# Patient Record
Sex: FEMALE | Race: WHITE | Employment: UNEMPLOYED | URBAN - METROPOLITAN AREA
[De-identification: names, ages, dates, MRNs, and addresses within clinical notes are randomized per-mention and may not be internally consistent; named-entity substitution may affect disease eponyms.]

---

## 2022-12-31 ENCOUNTER — HOSPITAL ENCOUNTER (INPATIENT)
Age: 32
LOS: 3 days | Discharge: HOME | End: 2023-01-03
Attending: OBSTETRICS & GYNECOLOGY | Admitting: OBSTETRICS & GYNECOLOGY
Payer: COMMERCIAL

## 2022-12-31 PROBLEM — O26.893 ABDOMINAL PAIN DURING PREGNANCY, THIRD TRIMESTER: Status: ACTIVE | Noted: 2022-12-31

## 2022-12-31 PROBLEM — R10.9 ABDOMINAL PAIN DURING PREGNANCY, THIRD TRIMESTER: Status: ACTIVE | Noted: 2022-12-31

## 2022-12-31 LAB
ABO + RH BLD: NORMAL
BACTERIA SPEC CULT: NORMAL
BASOPHILS # BLD: 0.1 K/UL (ref 0–0.2)
BASOPHILS NFR BLD: 0 % (ref 0–2)
BLOOD GROUP ANTIBODIES SERPL: NORMAL
DIFFERENTIAL METHOD BLD: ABNORMAL
EOSINOPHIL # BLD: 0 K/UL (ref 0–0.8)
EOSINOPHIL NFR BLD: 0 % (ref 0.5–7.8)
ERYTHROCYTE [DISTWIDTH] IN BLOOD BY AUTOMATED COUNT: 13.4 % (ref 11.9–14.6)
HCT VFR BLD AUTO: 34.8 % (ref 35.8–46.3)
HGB BLD-MCNC: 12.1 G/DL (ref 11.7–15.4)
IMM GRANULOCYTES # BLD AUTO: 0.3 K/UL (ref 0–0.5)
IMM GRANULOCYTES NFR BLD AUTO: 1 % (ref 0–5)
LYMPHOCYTES # BLD: 1.4 K/UL (ref 0.5–4.6)
LYMPHOCYTES NFR BLD: 5 % (ref 13–44)
MCH RBC QN AUTO: 31.1 PG (ref 26.1–32.9)
MCHC RBC AUTO-ENTMCNC: 34.8 G/DL (ref 31.4–35)
MCV RBC AUTO: 89.5 FL (ref 82–102)
MONOCYTES # BLD: 1.8 K/UL (ref 0.1–1.3)
MONOCYTES NFR BLD: 7 % (ref 4–12)
NEUTS SEG # BLD: 24 K/UL (ref 1.7–8.2)
NEUTS SEG NFR BLD: 87 % (ref 43–78)
NRBC # BLD: 0 K/UL (ref 0–0.2)
PLATELET # BLD AUTO: 217 K/UL (ref 150–450)
PMV BLD AUTO: 11.5 FL (ref 9.4–12.3)
RBC # BLD AUTO: 3.89 M/UL (ref 4.05–5.2)
SERVICE CMNT-IMP: NORMAL
SPECIMEN EXP DATE BLD: NORMAL
WBC # BLD AUTO: 27.5 K/UL (ref 4.3–11.1)

## 2022-12-31 PROCEDURE — 99284 EMERGENCY DEPT VISIT MOD MDM: CPT

## 2022-12-31 PROCEDURE — 85025 COMPLETE CBC W/AUTO DIFF WBC: CPT

## 2022-12-31 PROCEDURE — 86762 RUBELLA ANTIBODY: CPT

## 2022-12-31 PROCEDURE — 51701 INSERT BLADDER CATHETER: CPT

## 2022-12-31 PROCEDURE — 87340 HEPATITIS B SURFACE AG IA: CPT

## 2022-12-31 PROCEDURE — 0UQMXZZ REPAIR VULVA, EXTERNAL APPROACH: ICD-10-PCS | Performed by: OBSTETRICS & GYNECOLOGY

## 2022-12-31 PROCEDURE — 36415 COLL VENOUS BLD VENIPUNCTURE: CPT

## 2022-12-31 PROCEDURE — 86592 SYPHILIS TEST NON-TREP QUAL: CPT

## 2022-12-31 PROCEDURE — 6360000002 HC RX W HCPCS: Performed by: OBSTETRICS & GYNECOLOGY

## 2022-12-31 PROCEDURE — 1100000000 HC RM PRIVATE

## 2022-12-31 PROCEDURE — 86780 TREPONEMA PALLIDUM: CPT

## 2022-12-31 PROCEDURE — 0UQGXZZ REPAIR VAGINA, EXTERNAL APPROACH: ICD-10-PCS | Performed by: OBSTETRICS & GYNECOLOGY

## 2022-12-31 PROCEDURE — 86900 BLOOD TYPING SEROLOGIC ABO: CPT

## 2022-12-31 PROCEDURE — 87653 STREP B DNA AMP PROBE: CPT

## 2022-12-31 PROCEDURE — 87081 CULTURE SCREEN ONLY: CPT

## 2022-12-31 PROCEDURE — 00HU33Z INSERTION OF INFUSION DEVICE INTO SPINAL CANAL, PERCUTANEOUS APPROACH: ICD-10-PCS | Performed by: ANESTHESIOLOGY

## 2022-12-31 RX ORDER — ACETAMINOPHEN 325 MG/1
650 TABLET ORAL EVERY 4 HOURS PRN
Status: DISCONTINUED | OUTPATIENT
Start: 2022-12-31 | End: 2023-01-01 | Stop reason: HOSPADM

## 2022-12-31 RX ORDER — SODIUM CHLORIDE 0.9 % (FLUSH) 0.9 %
5-40 SYRINGE (ML) INJECTION PRN
Status: DISCONTINUED | OUTPATIENT
Start: 2022-12-31 | End: 2023-01-01 | Stop reason: HOSPADM

## 2022-12-31 RX ORDER — CARBOPROST TROMETHAMINE 250 UG/ML
250 INJECTION, SOLUTION INTRAMUSCULAR
Status: DISCONTINUED | OUTPATIENT
Start: 2022-12-31 | End: 2023-01-01 | Stop reason: HOSPADM

## 2022-12-31 RX ORDER — MISOPROSTOL 200 UG/1
800 TABLET ORAL
Status: DISCONTINUED | OUTPATIENT
Start: 2022-12-31 | End: 2023-01-01 | Stop reason: HOSPADM

## 2022-12-31 RX ORDER — SODIUM CHLORIDE, SODIUM LACTATE, POTASSIUM CHLORIDE, AND CALCIUM CHLORIDE .6; .31; .03; .02 G/100ML; G/100ML; G/100ML; G/100ML
500 INJECTION, SOLUTION INTRAVENOUS PRN
Status: DISCONTINUED | OUTPATIENT
Start: 2022-12-31 | End: 2023-01-01 | Stop reason: HOSPADM

## 2022-12-31 RX ORDER — SODIUM CHLORIDE 0.9 % (FLUSH) 0.9 %
5-40 SYRINGE (ML) INJECTION EVERY 12 HOURS SCHEDULED
Status: DISCONTINUED | OUTPATIENT
Start: 2022-12-31 | End: 2023-01-01 | Stop reason: HOSPADM

## 2022-12-31 RX ORDER — METHYLERGONOVINE MALEATE 0.2 MG/ML
200 INJECTION INTRAVENOUS
Status: DISCONTINUED | OUTPATIENT
Start: 2022-12-31 | End: 2023-01-01 | Stop reason: HOSPADM

## 2022-12-31 RX ORDER — SODIUM CHLORIDE, SODIUM LACTATE, POTASSIUM CHLORIDE, CALCIUM CHLORIDE 600; 310; 30; 20 MG/100ML; MG/100ML; MG/100ML; MG/100ML
INJECTION, SOLUTION INTRAVENOUS CONTINUOUS
Status: DISCONTINUED | OUTPATIENT
Start: 2022-12-31 | End: 2023-01-01 | Stop reason: HOSPADM

## 2022-12-31 RX ORDER — SODIUM CHLORIDE 9 MG/ML
25 INJECTION, SOLUTION INTRAVENOUS PRN
Status: DISCONTINUED | OUTPATIENT
Start: 2022-12-31 | End: 2023-01-01 | Stop reason: HOSPADM

## 2022-12-31 RX ORDER — TRANEXAMIC ACID 10 MG/ML
1000 INJECTION, SOLUTION INTRAVENOUS
Status: DISCONTINUED | OUTPATIENT
Start: 2022-12-31 | End: 2023-01-01 | Stop reason: HOSPADM

## 2022-12-31 RX ORDER — SODIUM CHLORIDE, SODIUM LACTATE, POTASSIUM CHLORIDE, AND CALCIUM CHLORIDE .6; .31; .03; .02 G/100ML; G/100ML; G/100ML; G/100ML
1000 INJECTION, SOLUTION INTRAVENOUS PRN
Status: DISCONTINUED | OUTPATIENT
Start: 2022-12-31 | End: 2023-01-01 | Stop reason: HOSPADM

## 2022-12-31 RX ORDER — HYDROCORTISONE ACETATE PRAMOXINE HCL 2.5; 1 G/100G; G/100G
CREAM TOPICAL
Status: DISCONTINUED | OUTPATIENT
Start: 2022-12-31 | End: 2023-01-01

## 2022-12-31 RX ORDER — ONDANSETRON 2 MG/ML
4 INJECTION INTRAMUSCULAR; INTRAVENOUS EVERY 6 HOURS PRN
Status: DISCONTINUED | OUTPATIENT
Start: 2022-12-31 | End: 2023-01-01 | Stop reason: HOSPADM

## 2022-12-31 RX ADMIN — Medication 2 MILLI-UNITS/MIN: at 20:00

## 2022-12-31 NOTE — H&P
History & Physical    Name: Dave Dumont MRN: [id number]  SSN: [id number]    YOB: 1990  Age: 29 y.o. Sex: female        Subjective: Ctxs  HPI: 30 yo G1 at 39+6 wks presents c/o ctxs x 2 days. She received [facility] thru a midwife locally and also saw Dr. Leonie Smith for a GBS swab. She does not know the result. She denies lof. She has had vaginal bleeding. She denies complications this pregnancy. She said that she was 4cm when she was checked this morning. She started Valtrex 1gm daily at 36 wks for HSV prophylaxis. She denies any recent symptoms c/w an HSV outbreak. She reports an DANIEL of 23. Estimated Date of Delivery: 23  OB History    Para Term  AB Living   1             SAB IAB Ectopic Molar Multiple Live Births                    # Outcome Date GA Lbr Rock/2nd Weight Sex Delivery Anes PTL Lv   1 Current                History reviewed. No pertinent past medical history. HSV    No past surgical history on file. Right calf surgery and tympanic membrane procedures as a child. Social History     Occupational History    Not on file   Tobacco Use    Smoking status: Not on file    Smokeless tobacco: Not on file   Substance and Sexual Activity    Alcohol use: Not on file    Drug use: Not on file    Sexual activity: Not on file   Denies tob/etoh/illicit drugs    No family history on file. No Known Allergies Denies  Prior to Admission medications    Not on File      Meds: PNV and Valtrex    Review of Systems: Pertinent items are noted in HPI. 10 point ROS is otherwise positive for general discomforts in labor.      Objective:     Vitals:  Vitals:    22 1804 22 1816 22 1817 22 1820   BP: (!) 145/79 (!) 148/84 (!) 150/84 134/81   Pulse: (!) 112 (!) 115 (!) 107 87        Physical Exam:   Patient without distress  Heart: Regular rate and rhythm  Lung: clear to auscultation throughout lung fields and normal respiratory effort  Abdomen: soft, nontender  Fundus: soft and non tender  Perineum: blood absent, amniotic fluidabsent  Cervical Exam: 7 cm dilated    100% effaced    0 station    Lower Extremities:  - Edema No  SSE: NEFG, no lesions seen externally or vaginally  Membranes:  Intact  Fetal Heart Rate: minimal tracing at this time  Uterine contractions: minimal tracing at this time    Prenatal Labs:   No results found for: ABORH, RUBELLAEXT, GRBSEXT, HBSAGEXT, HIVEXT, RPREXT, GONNOEXT, CHLAMEXT      Assessment/Plan: 30 yo G1 at 39+6 wks presents in active labor. Principal Problem:    Abdominal pain during pregnancy, third trimester  Resolved Problems:    * No resolved hospital problems.  *       Plan:   Admit  Pt requesting epidural  Rapid GBS sent; based on current recommendations will wait to start IV PCN if indicated  OB panel ordered and UDS; no offices open at this time and no records in [facility]

## 2022-12-31 NOTE — PROGRESS NOTES
Pt presents to [facility] ED with c/o of laboring. Pt of [facility]. Dr. Toño Myron called for Assessment due to pt pain level. SVE as documented. Pt admitted for labor to [facility].

## 2023-01-01 LAB
AMPHET UR QL SCN: NEGATIVE
APPEARANCE UR: ABNORMAL
BACTERIA URNS QL MICRO: 0 /HPF
BARBITURATES UR QL SCN: NEGATIVE
BASOPHILS # BLD: 0 K/UL (ref 0–0.2)
BASOPHILS NFR BLD: 0 % (ref 0–2)
BENZODIAZ UR QL: NEGATIVE
BILIRUB UR QL: ABNORMAL
CANNABINOIDS UR QL SCN: NEGATIVE
COCAINE UR QL SCN: NEGATIVE
COLOR UR: ABNORMAL
DIFFERENTIAL METHOD BLD: ABNORMAL
EOSINOPHIL # BLD: 0 K/UL (ref 0–0.8)
EOSINOPHIL NFR BLD: 0 % (ref 0.5–7.8)
EPI CELLS #/AREA URNS HPF: ABNORMAL /HPF
ERYTHROCYTE [DISTWIDTH] IN BLOOD BY AUTOMATED COUNT: 13.6 % (ref 11.9–14.6)
GLUCOSE UR STRIP.AUTO-MCNC: NEGATIVE MG/DL
HBV SURFACE AG SER QL: NONREACTIVE
HCT VFR BLD AUTO: 30.5 % (ref 35.8–46.3)
HGB BLD-MCNC: 10.3 G/DL (ref 11.7–15.4)
HGB UR QL STRIP: ABNORMAL
IMM GRANULOCYTES # BLD AUTO: 0.1 K/UL (ref 0–0.5)
IMM GRANULOCYTES NFR BLD AUTO: 1 % (ref 0–5)
KETONES UR QL STRIP.AUTO: 15 MG/DL
LEUKOCYTE ESTERASE UR QL STRIP.AUTO: ABNORMAL
LYMPHOCYTES # BLD: 1.7 K/UL (ref 0.5–4.6)
LYMPHOCYTES NFR BLD: 9 % (ref 13–44)
MCH RBC QN AUTO: 31.3 PG (ref 26.1–32.9)
MCHC RBC AUTO-ENTMCNC: 33.8 G/DL (ref 31.4–35)
MCV RBC AUTO: 92.7 FL (ref 82–102)
METHADONE UR QL: NEGATIVE
MONOCYTES # BLD: 1.5 K/UL (ref 0.1–1.3)
MONOCYTES NFR BLD: 8 % (ref 4–12)
NEUTS SEG # BLD: 16 K/UL (ref 1.7–8.2)
NEUTS SEG NFR BLD: 83 % (ref 43–78)
NITRITE UR QL STRIP.AUTO: NEGATIVE
NRBC # BLD: 0 K/UL (ref 0–0.2)
OPIATES UR QL: NEGATIVE
PCP UR QL: NEGATIVE
PH UR STRIP: 5 (ref 5–9)
PLATELET # BLD AUTO: 147 K/UL (ref 150–450)
PMV BLD AUTO: 11.3 FL (ref 9.4–12.3)
PROT UR STRIP-MCNC: 30 MG/DL
RBC # BLD AUTO: 3.29 M/UL (ref 4.05–5.2)
RBC #/AREA URNS HPF: ABNORMAL /HPF
RPR SER QL: NONREACTIVE
RUBV IGG SERPL IA-ACNC: 439 IU/ML (ref 0–50)
SP GR UR REFRACTOMETRY: 1.02 (ref 1–1.02)
UROBILINOGEN UR QL STRIP.AUTO: 1 EU/DL (ref 0.2–1)
WBC # BLD AUTO: 19.4 K/UL (ref 4.3–11.1)
WBC URNS QL MICRO: ABNORMAL /HPF

## 2023-01-01 PROCEDURE — 6370000000 HC RX 637 (ALT 250 FOR IP): Performed by: OBSTETRICS & GYNECOLOGY

## 2023-01-01 PROCEDURE — 6370000000 HC RX 637 (ALT 250 FOR IP)

## 2023-01-01 PROCEDURE — 7220000101 HC DELIVERY VAGINAL/SINGLE

## 2023-01-01 PROCEDURE — 36415 COLL VENOUS BLD VENIPUNCTURE: CPT

## 2023-01-01 PROCEDURE — 81001 URINALYSIS AUTO W/SCOPE: CPT

## 2023-01-01 PROCEDURE — 1100000000 HC RM PRIVATE

## 2023-01-01 PROCEDURE — 85025 COMPLETE CBC W/AUTO DIFF WBC: CPT

## 2023-01-01 PROCEDURE — 7210000100 HC LABOR FEE PER 1 HR

## 2023-01-01 PROCEDURE — 7100000010 HC PHASE II RECOVERY - FIRST 15 MIN

## 2023-01-01 PROCEDURE — 80307 DRUG TEST PRSMV CHEM ANLYZR: CPT

## 2023-01-01 PROCEDURE — 7100000011 HC PHASE II RECOVERY - ADDTL 15 MIN

## 2023-01-01 PROCEDURE — 2580000003 HC RX 258: Performed by: OBSTETRICS & GYNECOLOGY

## 2023-01-01 PROCEDURE — 51701 INSERT BLADDER CATHETER: CPT

## 2023-01-01 RX ORDER — ACYCLOVIR 800 MG/1
800 TABLET ORAL DAILY
COMMUNITY

## 2023-01-01 RX ORDER — LIDOCAINE HYDROCHLORIDE 10 MG/ML
INJECTION, SOLUTION INFILTRATION; PERINEURAL
Status: DISPENSED
Start: 2023-01-01 | End: 2023-01-01

## 2023-01-01 RX ORDER — OXYCODONE HYDROCHLORIDE 5 MG/1
10 TABLET ORAL EVERY 4 HOURS PRN
Status: DISCONTINUED | OUTPATIENT
Start: 2023-01-01 | End: 2023-01-03 | Stop reason: HOSPADM

## 2023-01-01 RX ORDER — DOCUSATE SODIUM 100 MG/1
100 CAPSULE, LIQUID FILLED ORAL 2 TIMES DAILY
Status: DISCONTINUED | OUTPATIENT
Start: 2023-01-01 | End: 2023-01-03 | Stop reason: HOSPADM

## 2023-01-01 RX ORDER — SODIUM CHLORIDE 0.9 % (FLUSH) 0.9 %
5-40 SYRINGE (ML) INJECTION EVERY 12 HOURS SCHEDULED
Status: DISCONTINUED | OUTPATIENT
Start: 2023-01-01 | End: 2023-01-01

## 2023-01-01 RX ORDER — SODIUM CHLORIDE 0.9 % (FLUSH) 0.9 %
5-40 SYRINGE (ML) INJECTION PRN
Status: DISCONTINUED | OUTPATIENT
Start: 2023-01-01 | End: 2023-01-01

## 2023-01-01 RX ORDER — HYDROCORTISONE ACETATE PRAMOXINE HCL 2.5; 1 G/100G; G/100G
CREAM TOPICAL 3 TIMES DAILY PRN
Status: DISCONTINUED | OUTPATIENT
Start: 2023-01-01 | End: 2023-01-03 | Stop reason: HOSPADM

## 2023-01-01 RX ORDER — LIDOCAINE HYDROCHLORIDE 20 MG/ML
JELLY TOPICAL ONCE
Status: COMPLETED | OUTPATIENT
Start: 2023-01-01 | End: 2023-01-01

## 2023-01-01 RX ORDER — IBUPROFEN 600 MG/1
600 TABLET ORAL EVERY 8 HOURS SCHEDULED
Status: DISCONTINUED | OUTPATIENT
Start: 2023-01-01 | End: 2023-01-03 | Stop reason: HOSPADM

## 2023-01-01 RX ORDER — LANOLIN
CREAM (ML) TOPICAL PRN
Status: DISCONTINUED | OUTPATIENT
Start: 2023-01-01 | End: 2023-01-03 | Stop reason: HOSPADM

## 2023-01-01 RX ORDER — ONDANSETRON 2 MG/ML
4 INJECTION INTRAMUSCULAR; INTRAVENOUS EVERY 6 HOURS PRN
Status: DISCONTINUED | OUTPATIENT
Start: 2023-01-01 | End: 2023-01-03 | Stop reason: HOSPADM

## 2023-01-01 RX ORDER — KETOROLAC TROMETHAMINE 30 MG/ML
30 INJECTION, SOLUTION INTRAMUSCULAR; INTRAVENOUS EVERY 6 HOURS
Status: DISCONTINUED | OUTPATIENT
Start: 2023-01-01 | End: 2023-01-01

## 2023-01-01 RX ORDER — OXYCODONE HYDROCHLORIDE 5 MG/1
5 TABLET ORAL EVERY 4 HOURS PRN
Status: DISCONTINUED | OUTPATIENT
Start: 2023-01-01 | End: 2023-01-03 | Stop reason: HOSPADM

## 2023-01-01 RX ORDER — LIDOCAINE HYDROCHLORIDE 20 MG/ML
JELLY TOPICAL
Status: COMPLETED
Start: 2023-01-01 | End: 2023-01-01

## 2023-01-01 RX ORDER — SODIUM CHLORIDE, SODIUM LACTATE, POTASSIUM CHLORIDE, CALCIUM CHLORIDE 600; 310; 30; 20 MG/100ML; MG/100ML; MG/100ML; MG/100ML
INJECTION, SOLUTION INTRAVENOUS CONTINUOUS
Status: DISCONTINUED | OUTPATIENT
Start: 2023-01-01 | End: 2023-01-01

## 2023-01-01 RX ORDER — SODIUM CHLORIDE 9 MG/ML
INJECTION, SOLUTION INTRAVENOUS PRN
Status: DISCONTINUED | OUTPATIENT
Start: 2023-01-01 | End: 2023-01-01

## 2023-01-01 RX ADMIN — OXYCODONE 5 MG: 5 TABLET ORAL at 04:34

## 2023-01-01 RX ADMIN — HYDROCORTISONE ACETATE PRAMOXINE HCL: 2.5; 1 CREAM TOPICAL at 18:44

## 2023-01-01 RX ADMIN — WITCH HAZEL: 500 SOLUTION RECTAL; TOPICAL at 18:45

## 2023-01-01 RX ADMIN — HYDROCORTISONE ACETATE PRAMOXINE HCL: 2.5; 1 CREAM TOPICAL at 18:45

## 2023-01-01 RX ADMIN — LIDOCAINE HYDROCHLORIDE: 20 JELLY TOPICAL at 10:12

## 2023-01-01 RX ADMIN — IBUPROFEN 600 MG: 600 TABLET ORAL at 15:12

## 2023-01-01 RX ADMIN — OXYCODONE 10 MG: 5 TABLET ORAL at 15:11

## 2023-01-01 RX ADMIN — SODIUM CHLORIDE, PRESERVATIVE FREE 10 ML: 5 INJECTION INTRAVENOUS at 09:44

## 2023-01-01 RX ADMIN — Medication: at 15:11

## 2023-01-01 RX ADMIN — OXYCODONE 5 MG: 5 TABLET ORAL at 22:13

## 2023-01-01 RX ADMIN — OXYCODONE 10 MG: 5 TABLET ORAL at 10:26

## 2023-01-01 ASSESSMENT — PAIN SCALES - GENERAL
PAINLEVEL_OUTOF10: 7
PAINLEVEL_OUTOF10: 7
PAINLEVEL_OUTOF10: 4
PAINLEVEL_OUTOF10: 2
PAINLEVEL_OUTOF10: 4
PAINLEVEL_OUTOF10: 2
PAINLEVEL_OUTOF10: 7

## 2023-01-01 ASSESSMENT — PAIN DESCRIPTION - PAIN TYPE: TYPE: ACUTE PAIN

## 2023-01-01 ASSESSMENT — PAIN DESCRIPTION - DESCRIPTORS
DESCRIPTORS: ACHING;SORE
DESCRIPTORS: ACHING;CRAMPING;BURNING
DESCRIPTORS: SORE;ACHING;BURNING
DESCRIPTORS: ACHING;BURNING;CRAMPING

## 2023-01-01 ASSESSMENT — PAIN DESCRIPTION - LOCATION
LOCATION: PERINEUM
LOCATION: ABDOMEN;PERINEUM
LOCATION: ABDOMEN
LOCATION: VAGINA;PERINEUM
LOCATION: ABDOMEN

## 2023-01-01 ASSESSMENT — PAIN DESCRIPTION - ORIENTATION: ORIENTATION: LOWER

## 2023-01-01 ASSESSMENT — PAIN DESCRIPTION - ONSET: ONSET: GRADUAL

## 2023-01-01 ASSESSMENT — PAIN DESCRIPTION - FREQUENCY: FREQUENCY: INTERMITTENT

## 2023-01-01 ASSESSMENT — PAIN - FUNCTIONAL ASSESSMENT: PAIN_FUNCTIONAL_ASSESSMENT: ACTIVITIES ARE NOT PREVENTED

## 2023-01-01 NOTE — PROGRESS NOTES
Labor Progress Note  Patient seen, fetal heart rate and contraction pattern evaluated, patient examined. Comfortable with epidural.     Patient Vitals for the past 1 hrs:   BP Temp Temp src Pulse Resp SpO2   12/31/22 1907 119/61 97.9 °F (36.6 °C) Oral (!) 101 16 99 %   12/31/22 1852 124/63 -- -- (!) 107 -- --   12/31/22 1848 120/61 -- -- (!) 103 -- --   12/31/22 1846 120/71 -- -- 98 -- --   12/31/22 1844 117/65 -- -- (!) 102 -- --   12/31/22 1843 116/62 -- -- (!) 108 -- --   12/31/22 1842 (!) 115/59 -- -- 96 -- --       Physical Exam:  Cervical Exam:  7 cm dilated    100% effaced    0 station    Membranes:   fetal hair palpable, time of rupture unknown  Fetal Heart Rate: Baseline: 125-135 per minute  Variability: moderate  Accelerations: yes  Decelerations: none  Uterine contractions: regular, every 5 minutes    Assessment/Plan:  GBS neg  Time of rupture unknown; pt thinks possibly en route to hospital.  Start pitocin augmentation    Addendum:   US: Minimal fluid, anterior placenta, vtx. Expressed concern to pt and partner regarding elevated WBC count and unknown time of rupture. At this point, no maternal fever or fetal tachycardia. Will monitor closely.

## 2023-01-01 NOTE — PLAN OF CARE
Problem: Postpartum  Goal: Experiences normal postpartum course  Description:  Postpartum OB-Pregnancy care plan goal which identifies if the mother is experiencing a normal postpartum course  Outcome: Progressing  Flowsheets (Taken 2023)  Experiences Normal Postpartum Course:   Monitor maternal vital signs   Assess uterine involution  Goal: Appropriate maternal -  bonding  Description:  Postpartum OB-Pregnancy care plan goal which identifies if the mother and  are bonding appropriately  Outcome: Progressing  Goal: Establishment of infant feeding pattern  Description:  Postpartum OB-Pregnancy care plan goal which identifies if the mother is establishing a feeding pattern with their   Outcome: Progressing  Flowsheets (Taken 2023)  Establishment of Infant Feeding Pattern:   Assess breast/bottle feeding   Refer to lactation as needed  Goal: Incisions, wounds, or drain sites healing without S/S of infection  Outcome: Progressing  Flowsheets (Taken 2023)  Incisions, Wounds, or Drain Sites Healing Without Sign and Symptoms of Infection: ADMISSION and DAILY: Assess and document risk factors for pressure ulcer development     Problem: Pain  Goal: Verbalizes/displays adequate comfort level or baseline comfort level  Outcome: Progressing  Flowsheets (Taken 2023)  Verbalizes/displays adequate comfort level or baseline comfort level:   Encourage patient to monitor pain and request assistance   Assess pain using appropriate pain scale   Administer analgesics based on type and severity of pain and evaluate response   Implement non-pharmacological measures as appropriate and evaluate response   Consider cultural and social influences on pain and pain management   Notify Licensed Independent Practitioner if interventions unsuccessful or patient reports new pain     Problem: Infection - Adult  Goal: Absence of infection at discharge  Outcome: Progressing  Flowsheets (Taken 1/1/2023 8706)  Absence of infection at discharge:   Assess and monitor for signs and symptoms of infection   Monitor lab/diagnostic results   Monitor all insertion sites i.e., indwelling lines, tubes and drains   Administer medications as ordered   Instruct and encourage patient and family to use good hand hygiene technique  Goal: Absence of infection during hospitalization  Outcome: Progressing  Flowsheets (Taken 1/1/2023 0808)  Absence of infection during hospitalization:   Assess and monitor for signs and symptoms of infection   Monitor lab/diagnostic results   Monitor all insertion sites i.e., indwelling lines, tubes and drains   Administer medications as ordered   Instruct and encourage patient and family to use good hand hygiene technique  Goal: Absence of fever/infection during anticipated neutropenic period  Outcome: Progressing     Problem: Safety - Adult  Goal: Free from fall injury  Outcome: Progressing  Flowsheets (Taken 1/1/2023 0808)  Free From Fall Injury:   Instruct family/caregiver on patient safety   Based on caregiver fall risk screen, instruct family/caregiver to ask for assistance with transferring infant if caregiver noted to have fall risk factors     Problem: Discharge Planning  Goal: Discharge to home or other facility with appropriate resources  Outcome: Progressing  Flowsheets (Taken 1/1/2023 0808)  Discharge to home or other facility with appropriate resources:   Identify barriers to discharge with patient and caregiver   Arrange for needed discharge resources and transportation as appropriate   Identify discharge learning needs (meds, wound care, etc)   Arrange for interpreters to assist at discharge as needed   Refer to discharge planning if patient needs post-hospital services based on physician order or complex needs related to functional status, cognitive ability or social support system     Problem: Chronic Conditions and Co-morbidities  Goal: Patient's chronic conditions and co-morbidity symptoms are monitored and maintained or improved  Outcome: Progressing  Flowsheets (Taken 2023 0838)  Care Plan - Patient's Chronic Conditions and Co-Morbidity Symptoms are Monitored and Maintained or Improved:   Collaborate with multidisciplinary team to address chronic and comorbid conditions and prevent exacerbation or deterioration   Update acute care plan with appropriate goals if chronic or comorbid symptoms are exacerbated and prevent overall improvement and discharge   Monitor and assess patient's chronic conditions and comorbid symptoms for stability, deterioration, or improvement     Problem: Vaginal Birth or  Section  Goal: Fetal and maternal status remain reassuring during the birth process  Description:  Birth OB-Pregnancy care plan goal which identifies if the fetal and maternal status remain reassuring during the birth process  Outcome: Completed

## 2023-01-01 NOTE — L&D DELIVERY NOTE
Mother's Information      Labor Events     Labor?: No  Cervical Ripening:   Now               Az Lozada [[id number]]      Labor Events     Labor?: No   Steroids?: None  Cervical Ripening Date/Time:       Rupture Date/Time:     Rupture Type: SROM  Fluid Color: Other (Comment)  Fluid Volume:  Moderate  Induction: None  Augmentation: Oxytocin  Labor Complications: None       Anesthesia    Method: Epidural       Start Pushing      Labor onset date/time:   Now     Dilation complete date/time: 23 00:25:00 Now     Start pushing date/time: 2023 00:45:00   Decision date/time (emergent ):           Labor Length    2nd stage: 1h 17m  3rd stage: 0h 04m       Delivery ([place])      Delivery Date/Time:  23 01:42:00   Delivery Type: Vaginal, Spontaneous    Details:             Presentation    Presentation: Vertex  _: Occiput  _: Anterior       Shoulder Dystocia    Shoulder Dystocia Present?: No  Add Second Maneuver  Add Third Maneuver  Add Fourth Maneuver  Add Fifth Maneuver  Add Sixth Maneuver  Add Seventh Maneuver  Add Eighth Maneuver  Add Ninth Maneuver       Assisted Delivery Details    Forceps Attempted?: No  Vacuum Extractor Attempted?: No       Document Additional Attempt         Document Additional Attempt                 Cord    Vessels: 3 Vessels  Complications: None  Delayed Cord Clamping?: Yes  Cord Clamped Date/Time: 2023 01:43:00  Cord Blood Disposition: Lab  Gases Sent?: No       Placenta    Date/Time: 2023 01:46:00  Removal: Spontaneous  Appearance: Intact  Disposition: Discarded       Lacerations    Episiotomy: None  Other Lacerations: labial laceration, vaginal laceration  Labial Laceration: right Repaired?: Yes     Vaginal Laceration?: Yes Repaired?: Yes          Vaginal Counts    Initial Count Personnel: Parth Solo ST  Initial Count Verified By: Cele Mobley RN    Sponges Needles Instruments   Initial Counts Correct Correct Correct   Final Counts Correct Correct Correct   Final Count Personnel: Tesha TIMMONS  Final Count Verified By: Zaida Kat RN  Accurate Final Count?: Yes  If the count is incorrect due to Intentionally Retained Foreign Object (IRFO) add the IRFO LDA in Lines/Drains. Add LDA: Link to LDA       Blood Loss  Mother: Leif Gardner #[id number]     Start of Mother's Information      Delivery Blood Loss  22 1342 - 23 0823      Quantitative Blood Loss (mL) Hospital Encounter 250 grams    Total  250 mL               End of Mother's Information  Mother: Leif Gardner #[id number]                Delivery Providers    Delivering clinician: Odilon Ramírez MD     Provider Role    Odilon Ramírez MD [address] RN Primary Nurse    Dameon Pierre. Gregory Larkin RN Charge Nurse    [facility]               Assessment    Living Status: Living     Apgar Scoring Key:    0 1 2    Skin Color: Blue or pale Acrocyanotic Completely pink    Heart Rate: Absent <100 bpm >100 bpm    Reflex Irritability: No response Grimace Cry or active withdrawal    Muscle Tone: Limp Some flexion Active motion    Respiratory Effort: Absent Weak cry; hypoventilation Good, crying                      Skin Color:   Heart Rate:   Reflex Irritability:   Muscle Tone:   Respiratory Effort:    Total:            1 Minute:    1    2    1    2    2    8        Apgar 1 total from OB History    5 Minute:    1    2    2    2    2    9        Apgar 5 total from OB History    10 Minute:              15 Minute:              20 Minute:                        Apgars Assigned By: Lizeth Cash RN              Resuscitation    Method: Bulb Suction, Stimulation              Measurements      Birth Weight: 3490 g Birth Length: 0.52 m     Head Circumference: 0.33 m Chest Circumference: 0.33 m              Title      Skin to Skin Initiation Date/Time:       Skin to Skin End Date/Time:

## 2023-01-01 NOTE — PROGRESS NOTES
Labor Progress Note  Patient seen, fetal heart rate and contraction pattern evaluated, patient examined.     AVSS    Physical Exam:  Cervical Exam:  8 cm dilated    100% effaced    0 station    Membranes:   SROM, no fluid noted on pelvic exam  Fetal Heart Rate: Baseline: 130s per minute  Variability: moderate  Accelerations: yes  Decelerations: none  Uterine contractions: regular, every 2-4 minutes  Pitocin 6mu/min    Assessment/Plan:  Some cervical change  Continue to increase pitocin to achieve q 2 min ctxs

## 2023-01-01 NOTE — LACTATION NOTE

## 2023-01-01 NOTE — OP NOTE
[place]  OPERATIVE REPORT    Name:  Damaso Barger  MR#:  [id number]  :  1990  ACCOUNT #:  [de-identified]  DATE OF SERVICE:  2022    DIAGNOSES:  1.  A 39 plus 6 week intrauterine pregnancy. 2.  Labor. 3.  Insufficient prenatal care. 4.  General herpes complicating pregnancy. PROCEDURE PERFORMED:  Spontaneous vaginal delivery. SURGEON/OBSTETRICIAN:  Karine Pittman MD    ANESTHESIA:  Epidural.    COMPLICATIONS:  None. SPECIMENS REMOVED:  Cord blood and cord segment. ESTIMATED BLOOD LOSS:  400 mL. PATIENT CONDITION:  Stable. PROCEEDINGS:  This 29-year-old G1 at 44 plus 6 weeks presented to the [facility] emergency department complaining of contraction. The cervix was examined and was noted to be 7 cm. The patient received prenatal care at a local midwifery clinic. She denied complications of pregnancy. She did report a history of genital herpes and started Valtrex at 36 weeks' gestation. She was unsure of her GBS status. The patient was admitted and rapid GBS test was sent. She requested an epidural upon admission and reported adequate pain relief following its placement. The fetal heart rate was noted to be a category 1 tracing. After the GBS test resulted negative, a repeat examination was performed for assessment of membranes. Fetal scalp and hair were able to be palpated. The patient was unsure at the time of rupture. The patient then failed to make further cervical change and Pitocin was started in order to achieve contractions every 2 to 3 minutes. The patient progressed to 10 cm in dilation and began pushing from the +2 station. She proceeded to deliver via spontaneous vaginal birth. Fetal head was in the occiput anterior position and delivered atraumatically. No nuchal cord was noted. The body was delivered atraumatically. Mild meconium was noted in the fluid, and nose and mouth were bulb suctioned, and the infant was placed on the maternal abdomen.   The infant was vigorous upon delivery. Cord clamp was delayed by 60 seconds. The Pitocin infusion was then begun and the placenta delivered spontaneously and was noted to be intact and normal in appearance. Cervix and vagina were inspected. The right labial laceration and first-degree vaginal laceration were noted and repaired using 2-0 Vicryl in a normal fashion. Bleeding was limited and fundus was firm at the conclusion of the delivery. Sponge and needle counts were correct, and infant and mom were stable.       MD MIKE Babin/[id number]  D:  01/01/2023 5:54  T:  01/01/2023 13:32  JOB #:  [id number]

## 2023-01-01 NOTE — PROGRESS NOTES
Labor Progress Note  Patient seen, fetal heart rate and contraction pattern evaluated, patient examined.   Patient Vitals for the past 1 hrs:   BP Temp Temp src Pulse   01/01/23 0008 139/84 -- -- (!) 112   12/31/22 2353 138/83 100 °F (37.8 °C) Axillary (!) 106   Repeat temp 98.4 orally    Physical Exam:  Cervical Exam:  10 cm dilated    100% effaced    +1 station    Membranes: ruptured, unknown time  Fetal Heart Rate: Baseline: 130s per minute  Variability: moderate  Accelerations: yes  Decelerations: none  Uterine contractions: regular, every 1-2 minutes    Assessment/Plan:  Start pushing

## 2023-01-02 PROBLEM — A60.9 HSV (HERPES SIMPLEX VIRUS) ANOGENITAL INFECTION: Status: ACTIVE | Noted: 2019-05-16

## 2023-01-02 LAB
HCT VFR BLD AUTO: 28.1 % (ref 35.8–46.3)
HGB BLD-MCNC: 9.5 G/DL (ref 11.7–15.4)

## 2023-01-02 PROCEDURE — 36415 COLL VENOUS BLD VENIPUNCTURE: CPT

## 2023-01-02 PROCEDURE — 6370000000 HC RX 637 (ALT 250 FOR IP): Performed by: OBSTETRICS & GYNECOLOGY

## 2023-01-02 PROCEDURE — 1100000000 HC RM PRIVATE

## 2023-01-02 PROCEDURE — 85018 HEMOGLOBIN: CPT

## 2023-01-02 RX ORDER — FERROUS SULFATE 325(65) MG
325 TABLET ORAL
Status: DISCONTINUED | OUTPATIENT
Start: 2023-01-02 | End: 2023-01-03 | Stop reason: HOSPADM

## 2023-01-02 RX ADMIN — OXYCODONE 5 MG: 5 TABLET ORAL at 17:05

## 2023-01-02 RX ADMIN — DOCUSATE SODIUM 100 MG: 100 CAPSULE ORAL at 10:44

## 2023-01-02 RX ADMIN — OXYCODONE 5 MG: 5 TABLET ORAL at 06:30

## 2023-01-02 RX ADMIN — DOCUSATE SODIUM 100 MG: 100 CAPSULE ORAL at 20:43

## 2023-01-02 RX ADMIN — OXYCODONE 5 MG: 5 TABLET ORAL at 02:14

## 2023-01-02 RX ADMIN — OXYCODONE 5 MG: 5 TABLET ORAL at 10:43

## 2023-01-02 RX ADMIN — IBUPROFEN 600 MG: 600 TABLET ORAL at 20:43

## 2023-01-02 RX ADMIN — IBUPROFEN 600 MG: 600 TABLET ORAL at 10:43

## 2023-01-02 RX ADMIN — IBUPROFEN 600 MG: 600 TABLET ORAL at 02:15

## 2023-01-02 ASSESSMENT — PAIN DESCRIPTION - DESCRIPTORS
DESCRIPTORS: THROBBING;NAGGING
DESCRIPTORS: ACHING;SORE
DESCRIPTORS: ACHING;SORE

## 2023-01-02 ASSESSMENT — PAIN SCALES - GENERAL
PAINLEVEL_OUTOF10: 4
PAINLEVEL_OUTOF10: 4
PAINLEVEL_OUTOF10: 5
PAINLEVEL_OUTOF10: 2

## 2023-01-02 ASSESSMENT — PAIN DESCRIPTION - ONSET
ONSET: ON-GOING
ONSET: ON-GOING

## 2023-01-02 ASSESSMENT — PAIN DESCRIPTION - LOCATION
LOCATION: PERINEUM

## 2023-01-02 ASSESSMENT — PAIN DESCRIPTION - FREQUENCY
FREQUENCY: CONTINUOUS
FREQUENCY: CONTINUOUS

## 2023-01-02 ASSESSMENT — PAIN - FUNCTIONAL ASSESSMENT
PAIN_FUNCTIONAL_ASSESSMENT: ACTIVITIES ARE NOT PREVENTED
PAIN_FUNCTIONAL_ASSESSMENT: ACTIVITIES ARE NOT PREVENTED

## 2023-01-02 ASSESSMENT — PAIN DESCRIPTION - PAIN TYPE
TYPE: ACUTE PAIN
TYPE: ACUTE PAIN

## 2023-01-02 ASSESSMENT — PAIN DESCRIPTION - ORIENTATION
ORIENTATION: LOWER

## 2023-01-02 NOTE — PLAN OF CARE
Problem: Postpartum  Goal: Experiences normal postpartum course  Description:  Postpartum OB-Pregnancy care plan goal which identifies if the mother is experiencing a normal postpartum course  Outcome: Progressing  Flowsheets (Taken 2023)  Experiences Normal Postpartum Course:   Monitor maternal vital signs   Assess uterine involution  Goal: Appropriate maternal -  bonding  Description:  Postpartum OB-Pregnancy care plan goal which identifies if the mother and  are bonding appropriately  Outcome: Progressing  Goal: Establishment of infant feeding pattern  Description:  Postpartum OB-Pregnancy care plan goal which identifies if the mother is establishing a feeding pattern with their   Outcome: Progressing  Flowsheets (Taken 2023)  Establishment of Infant Feeding Pattern:   Assess breast/bottle feeding   Refer to lactation as needed  Goal: Incisions, wounds, or drain sites healing without S/S of infection  Outcome: Progressing     Problem: Pain  Goal: Verbalizes/displays adequate comfort level or baseline comfort level  Outcome: Progressing  Flowsheets (Taken 2023)  Verbalizes/displays adequate comfort level or baseline comfort level:   Encourage patient to monitor pain and request assistance   Assess pain using appropriate pain scale   Administer analgesics based on type and severity of pain and evaluate response   Implement non-pharmacological measures as appropriate and evaluate response     Problem: Infection - Adult  Goal: Absence of infection at discharge  Outcome: Progressing  Goal: Absence of infection during hospitalization  Outcome: Progressing  Goal: Absence of fever/infection during anticipated neutropenic period  Outcome: Completed     Problem: Safety - Adult  Goal: Free from fall injury  Outcome: Progressing  Flowsheets (Taken 2023)  Free From Fall Injury:   Instruct family/caregiver on patient safety   Based on caregiver fall risk screen, instruct family/caregiver to ask for assistance with transferring infant if caregiver noted to have fall risk factors     Problem: Discharge Planning  Goal: Discharge to home or other facility with appropriate resources  Outcome: Progressing     Problem: Chronic Conditions and Co-morbidities  Goal: Patient's chronic conditions and co-morbidity symptoms are monitored and maintained or improved  Outcome: Completed

## 2023-01-02 NOTE — LACTATION NOTE
This note was copied from a baby's chart. In to follow up with mom and infant. Mom stated that infant has slowly been improving with latching and maintaining the latc. Assisted mom with attempt to latch on both breasts and inafnt will still come off the breast to suck his tongue. Reviewed positioning with mom to get and maintain a deeper latch. Reviewed the second night of life as well. Lactation consultant will follow up tomorrow.

## 2023-01-02 NOTE — PLAN OF CARE
Problem: Postpartum  Goal: Experiences normal postpartum course  Description:  Postpartum OB-Pregnancy care plan goal which identifies if the mother is experiencing a normal postpartum course  2023 by Aayush Hauser RN  Outcome: Progressing  2023 1619 by Rebecca Mo RN  Outcome: Progressing  Flowsheets (Taken 2023 1445)  Experiences Normal Postpartum Course:   Monitor maternal vital signs   Assess uterine involution  2023 0856 by Mila Kapoor RN  Outcome: Progressing  Flowsheets (Taken 2023 0808)  Experiences Normal Postpartum Course:   Monitor maternal vital signs   Assess uterine involution  Goal: Appropriate maternal -  bonding  Description:  Postpartum OB-Pregnancy care plan goal which identifies if the mother and  are bonding appropriately  2023 by Aayush Hauser RN  Outcome: Progressing  2023 by Rebecca Mo RN  Outcome: Progressing  2023 0856 by Mila Kapoor RN  Outcome: Progressing  Goal: Establishment of infant feeding pattern  Description:  Postpartum OB-Pregnancy care plan goal which identifies if the mother is establishing a feeding pattern with their   2023 by Aayush Hauser RN  Outcome: Progressing  2023 1619 by Rebecca Mo RN  Outcome: Progressing  Flowsheets (Taken 2023 1445)  Establishment of Infant Feeding Pattern:   Assess breast/bottle feeding   Refer to lactation as needed  2023 0856 by Mila Kapoor RN  Outcome: Progressing  Flowsheets (Taken 2023 0808)  Establishment of Infant Feeding Pattern:   Assess breast/bottle feeding   Refer to lactation as needed  Goal: Incisions, wounds, or drain sites healing without S/S of infection  2023 by Aayush Hauser RN  Outcome: Progressing  2023 1619 by Rebecca Mo RN  Outcome: Progressing  2023 0856 by Mila Kapoor RN  Outcome: Progressing  Flowsheets (Taken 2023 6693)  Incisions, Wounds, or Drain Sites Healing Without Sign and Symptoms of Infection: ADMISSION and DAILY: Assess and document risk factors for pressure ulcer development     Problem: Pain  Goal: Verbalizes/displays adequate comfort level or baseline comfort level  1/1/2023 2244 by Robbin Alfonso RN  Outcome: Progressing  Flowsheets (Taken 1/1/2023 2000)  Verbalizes/displays adequate comfort level or baseline comfort level:   Encourage patient to monitor pain and request assistance   Assess pain using appropriate pain scale   Administer analgesics based on type and severity of pain and evaluate response   Implement non-pharmacological measures as appropriate and evaluate response   Consider cultural and social influences on pain and pain management   Notify Licensed Independent Practitioner if interventions unsuccessful or patient reports new pain  1/1/2023 1619 by Cierra Sotelo RN  Outcome: Progressing  Flowsheets (Taken 1/1/2023 1445)  Verbalizes/displays adequate comfort level or baseline comfort level:   Encourage patient to monitor pain and request assistance   Assess pain using appropriate pain scale   Administer analgesics based on type and severity of pain and evaluate response   Implement non-pharmacological measures as appropriate and evaluate response   Consider cultural and social influences on pain and pain management   Notify Licensed Independent Practitioner if interventions unsuccessful or patient reports new pain  1/1/2023 0856 by Dayo Narvaez RN  Outcome: Progressing  Flowsheets (Taken 1/1/2023 0808)  Verbalizes/displays adequate comfort level or baseline comfort level:   Encourage patient to monitor pain and request assistance   Assess pain using appropriate pain scale   Administer analgesics based on type and severity of pain and evaluate response   Implement non-pharmacological measures as appropriate and evaluate response   Consider cultural and social influences on pain and pain management   Notify Licensed Independent Practitioner if interventions unsuccessful or patient reports new pain     Problem: Infection - Adult  Goal: Absence of infection at discharge  1/1/2023 2244 by Mathew Powers RN  Outcome: Progressing  1/1/2023 1619 by Ti Cooper RN  Outcome: Progressing  1/1/2023 0856 by Mary Fuentes RN  Outcome: Progressing  Flowsheets (Taken 1/1/2023 8870)  Absence of infection at discharge:   Assess and monitor for signs and symptoms of infection   Monitor lab/diagnostic results   Monitor all insertion sites i.e., indwelling lines, tubes and drains   Administer medications as ordered   Instruct and encourage patient and family to use good hand hygiene technique  Goal: Absence of infection during hospitalization  1/1/2023 2244 by Mathew Powers RN  Outcome: Progressing  1/1/2023 1619 by Ti Cooper RN  Outcome: Progressing  1/1/2023 0856 by Mary Fuentes RN  Outcome: Progressing  Flowsheets (Taken 1/1/2023 6644)  Absence of infection during hospitalization:   Assess and monitor for signs and symptoms of infection   Monitor lab/diagnostic results   Monitor all insertion sites i.e., indwelling lines, tubes and drains   Administer medications as ordered   Instruct and encourage patient and family to use good hand hygiene technique  Goal: Absence of fever/infection during anticipated neutropenic period  1/1/2023 2244 by Mathew Powers RN  Outcome: Progressing  1/1/2023 1619 by Ti Cooper RN  Outcome: Progressing  1/1/2023 0856 by Mary Fuentes RN  Outcome: Progressing     Problem: Safety - Adult  Goal: Free from fall injury  1/1/2023 2244 by Mathew Powers RN  Outcome: Progressing  1/1/2023 1619 by Ti Cooper RN  Outcome: Progressing  Flowsheets (Taken 1/1/2023 1445)  Free From Fall Injury: Instruct family/caregiver on patient safety  1/1/2023 0856 by Mary Fuentes RN  Outcome: Progressing  Flowsheets (Taken 1/1/2023 0808)  Free From Fall Injury:   Instruct family/caregiver on patient safety   Based on caregiver fall risk screen, instruct family/caregiver to ask for assistance with transferring infant if caregiver noted to have fall risk factors     Problem: Discharge Planning  Goal: Discharge to home or other facility with appropriate resources  1/1/2023 2244 by Nia Lawler RN  Outcome: Progressing  1/1/2023 1619 by Bettie Weiss RN  Outcome: Progressing  1/1/2023 0856 by Stuart Gupta RN  Outcome: Progressing  Flowsheets (Taken 1/1/2023 6677)  Discharge to home or other facility with appropriate resources:   Identify barriers to discharge with patient and caregiver   Arrange for needed discharge resources and transportation as appropriate   Identify discharge learning needs (meds, wound care, etc)   Arrange for interpreters to assist at discharge as needed   Refer to discharge planning if patient needs post-hospital services based on physician order or complex needs related to functional status, cognitive ability or social support system     Problem: Chronic Conditions and Co-morbidities  Goal: Patient's chronic conditions and co-morbidity symptoms are monitored and maintained or improved  1/1/2023 2244 by Nia Lawler RN  Outcome: Progressing  1/1/2023 1619 by Bettie Weiss RN  Outcome: Progressing  1/1/2023 0856 by Stuart Gupta RN  Outcome: Progressing  Flowsheets (Taken 1/1/2023 0808)  Care Plan - Patient's Chronic Conditions and Co-Morbidity Symptoms are Monitored and Maintained or Improved:   Collaborate with multidisciplinary team to address chronic and comorbid conditions and prevent exacerbation or deterioration   Update acute care plan with appropriate goals if chronic or comorbid symptoms are exacerbated and prevent overall improvement and discharge   Monitor and assess patient's chronic conditions and comorbid symptoms for stability, deterioration, or improvement

## 2023-01-02 NOTE — PROGRESS NOTES
Post-Partum Day Number 1 Progress Note    Patient doing well post-partum without significant complaint. Voiding without difficulty, normal lochia. Feels slightly dizzy when up ambulating. ( at 01:42 am 23)     Vitals:  Patient Vitals for the past 8 hrs:   BP Temp Temp src Pulse Resp SpO2   23 0834 124/71 98 °F (36.7 °C) Oral 80 16 96 %     Temp (24hrs), Av.5 °F (36.9 °C), Min:98 °F (36.7 °C), Max:99.1 °F (37.3 °C)      Vital signs stable, afebrile. Exam:  Patient without distress. LCTA HRRR               Abdomen soft, fundus firm at level of umbilicus, nontender               Perineum with normal lochia noted. Lower extremities are negative for swelling, cords or tenderness.     Labs:   Recent Results (from the past 24 hour(s))   Urine Drug Screen    Collection Time: 23 11:26 AM   Result Value Ref Range    PCP, Urine Negative      Benzodiazepines, Urine Negative      Cocaine, Urine Negative      Amphetamine, Urine Negative      Methadone, Urine Negative      THC, TH-Cannabinol, Urine Negative      Opiates, Urine Negative      Barbiturates, Urine Negative     Urinalysis    Collection Time: 23 11:27 AM   Result Value Ref Range    Color, UA DARK YELLOW      Appearance CLOUDY      Specific Gravity, UA 1.018 1.001 - 1.023      pH, Urine 5.0 5.0 - 9.0      Protein, UA 30 (A) NEG mg/dL    Glucose, UA Negative mg/dL    Ketones, Urine 15 (A) NEG mg/dL    Bilirubin Urine SMALL (A) NEG      Blood, Urine LARGE (A) NEG      Urobilinogen, Urine 1.0 0.2 - 1.0 EU/dL    Nitrite, Urine Negative NEG      Leukocyte Esterase, Urine MODERATE (A) NEG      WBC, UA 20-50 0 /hpf    RBC, UA  0 /hpf    Epithelial Cells UA 10-20 0 /hpf    BACTERIA, URINE 0 0 /hpf   CBC with Auto Differential    Collection Time: 23 11:35 AM   Result Value Ref Range    WBC 19.4 (H) 4.3 - 11.1 K/uL    RBC 3.29 (L) 4.05 - 5.2 M/uL    Hemoglobin 10.3 (L) 11.7 - 15.4 g/dL Hematocrit 30.5 (L) 35.8 - 46.3 %    MCV 92.7 82.0 - 102.0 FL    MCH 31.3 26.1 - 32.9 PG    MCHC 33.8 31.4 - 35.0 g/dL    RDW 13.6 11.9 - 14.6 %    Platelets 235 (L) 735 - 450 K/uL    MPV 11.3 9.4 - 12.3 FL    nRBC 0.00 0.0 - 0.2 K/uL    Differential Type AUTOMATED      Seg Neutrophils 83 (H) 43 - 78 %    Lymphocytes 9 (L) 13 - 44 %    Monocytes 8 4.0 - 12.0 %    Eosinophils % 0 (L) 0.5 - 7.8 %    Basophils 0 0.0 - 2.0 %    Immature Granulocytes 1 0.0 - 5.0 %    Segs Absolute 16.0 (H) 1.7 - 8.2 K/UL    Absolute Lymph # 1.7 0.5 - 4.6 K/UL    Absolute Mono # 1.5 (H) 0.1 - 1.3 K/UL    Absolute Eos # 0.0 0.0 - 0.8 K/UL    Basophils Absolute 0.0 0.0 - 0.2 K/UL    Absolute Immature Granulocyte 0.1 0.0 - 0.5 K/UL     Rubella immune  RPR NR  O pos    Assessment and Plan:  Patient appears to be having uncomplicated post-partum course. Continue routine perineal care and maternal education.    Anemia - hgb 10.3 on admit, recheck hgb, iron sulfate PO with food daily and check orthostatic VS  Anticipate DC in AM

## 2023-01-03 VITALS
RESPIRATION RATE: 16 BRPM | SYSTOLIC BLOOD PRESSURE: 116 MMHG | DIASTOLIC BLOOD PRESSURE: 77 MMHG | TEMPERATURE: 98 F | OXYGEN SATURATION: 96 % | HEART RATE: 79 BPM

## 2023-01-03 PROBLEM — O26.893 ABDOMINAL PAIN DURING PREGNANCY, THIRD TRIMESTER: Status: RESOLVED | Noted: 2022-12-31 | Resolved: 2023-01-03

## 2023-01-03 PROBLEM — R10.9 ABDOMINAL PAIN DURING PREGNANCY, THIRD TRIMESTER: Status: RESOLVED | Noted: 2022-12-31 | Resolved: 2023-01-03

## 2023-01-03 PROCEDURE — 6370000000 HC RX 637 (ALT 250 FOR IP): Performed by: OBSTETRICS & GYNECOLOGY

## 2023-01-03 RX ORDER — IBUPROFEN 600 MG/1
600 TABLET ORAL EVERY 6 HOURS PRN
COMMUNITY
Start: 2023-01-03

## 2023-01-03 RX ORDER — FERROUS SULFATE 325(65) MG
325 TABLET ORAL
COMMUNITY
Start: 2023-01-04

## 2023-01-03 RX ORDER — HYDROCORTISONE ACETATE PRAMOXINE HCL 2.5; 1 G/100G; G/100G
CREAM TOPICAL 3 TIMES DAILY PRN
COMMUNITY
Start: 2023-01-03

## 2023-01-03 RX ORDER — PSEUDOEPHEDRINE HCL 30 MG
100 TABLET ORAL 2 TIMES DAILY PRN
COMMUNITY
Start: 2023-01-03

## 2023-01-03 RX ORDER — OXYCODONE HYDROCHLORIDE 5 MG/1
5 TABLET ORAL EVERY 6 HOURS PRN
Qty: 5 TABLET | Refills: 0 | Status: SHIPPED | OUTPATIENT
Start: 2023-01-03 | End: 2023-01-06

## 2023-01-03 RX ADMIN — OXYCODONE 5 MG: 5 TABLET ORAL at 02:47

## 2023-01-03 RX ADMIN — OXYCODONE 5 MG: 5 TABLET ORAL at 12:04

## 2023-01-03 RX ADMIN — IBUPROFEN 600 MG: 600 TABLET ORAL at 06:56

## 2023-01-03 RX ADMIN — FERROUS SULFATE TAB 325 MG (65 MG ELEMENTAL FE) 325 MG: 325 (65 FE) TAB at 08:33

## 2023-01-03 RX ADMIN — DOCUSATE SODIUM 100 MG: 100 CAPSULE ORAL at 08:33

## 2023-01-03 ASSESSMENT — PAIN SCALES - GENERAL
PAINLEVEL_OUTOF10: 5
PAINLEVEL_OUTOF10: 3
PAINLEVEL_OUTOF10: 6

## 2023-01-03 ASSESSMENT — PAIN DESCRIPTION - LOCATION
LOCATION: ABDOMEN;PERINEUM
LOCATION: ABDOMEN

## 2023-01-03 ASSESSMENT — PAIN DESCRIPTION - DESCRIPTORS
DESCRIPTORS: ACHING;DISCOMFORT
DESCRIPTORS: CRAMPING

## 2023-01-03 ASSESSMENT — PAIN - FUNCTIONAL ASSESSMENT: PAIN_FUNCTIONAL_ASSESSMENT: ACTIVITIES ARE NOT PREVENTED

## 2023-01-03 NOTE — DISCHARGE SUMMARY
Discharge summary for obstetrics    Miguel Perry  2/7/02126:82 PM  [id number]  [id number]    Admitting Physician: Ysabel Taylor MD  Discharging Physician: Zenobia Cool M.D. Admission Date: 12/31/2022  Delivery Date: 1/1/2023  Discharge Date: 1/3/2023    Admitting Diagnosis: Normal labor     Procedures: Vaginal delivery    Labs:   Admission on 12/31/2022, Discharged on 01/03/2023   Component Date Value    WBC 12/31/2022 27.5 (A)     RBC 12/31/2022 3.89 (A)     Hemoglobin 12/31/2022 12.1     Hematocrit 12/31/2022 34.8 (A)     MCV 12/31/2022 89.5     MCH 12/31/2022 31.1     MCHC 12/31/2022 34.8     RDW 12/31/2022 13.4     Platelets 10/82/0362 217     MPV 12/31/2022 11.5     nRBC 12/31/2022 0.00     Differential Type 12/31/2022 AUTOMATED     Seg Neutrophils 12/31/2022 87 (A)     Lymphocytes 12/31/2022 5 (A)     Monocytes 12/31/2022 7     Eosinophils % 12/31/2022 0 (A)     Basophils 12/31/2022 0     Immature Granulocytes 12/31/2022 1     Segs Absolute 12/31/2022 24.0 (A)     Absolute Lymph # 12/31/2022 1.4     Absolute Mono # 12/31/2022 1.8 (A)     Absolute Eos # 12/31/2022 0.0     Basophils Absolute 12/31/2022 0.1     Absolute Immature Granul* 12/31/2022 0.3     Crossmatch expiration da* 12/31/2022 01/03/2023,2359     ABO/Rh 12/31/2022 O POSITIVE     Antibody Screen 12/31/2022 NEG     Special Requests 12/31/2022 NO SPECIAL REQUESTS     Culture 12/31/2022 NO BETA HEMOLYTIC STREPTOCOCCUS GROUP B ISOLATED TO DATE     Special Requests 12/31/2022 NO SPECIAL REQUESTS     Culture 12/31/2022 NEGATIVE: GBS target nucleic acid is not detected. Presumed not colonized for GBS.      RPR 12/31/2022 NONREACTIVE     Hepatitis B Surface Ag 12/31/2022 NONREACTIVE     Rubella Antibody IgG 12/31/2022 439.00 (A)     Color, UA 01/01/2023 DARK YELLOW     Appearance 01/01/2023 CLOUDY     Specific Gravity, UA 01/01/2023 1.018     pH, Urine 01/01/2023 5.0     Protein, UA 01/01/2023 30 (A)     Glucose, UA 01/01/2023 Negative     Ketones, Urine 01/01/2023 15 (A)     Bilirubin Urine 01/01/2023 SMALL (A)     Blood, Urine 01/01/2023 LARGE (A)     Urobilinogen, Urine 01/01/2023 1.0     Nitrite, Urine 01/01/2023 Negative     Leukocyte Esterase, Urine 01/01/2023 MODERATE (A)     WBC, UA 01/01/2023 20-50     RBC, UA 01/01/2023      Epithelial Cells UA 01/01/2023 10-20     BACTERIA, URINE 01/01/2023 0     PCP, Urine 01/01/2023 Negative     Benzodiazepines, Urine 01/01/2023 Negative     Cocaine, Urine 01/01/2023 Negative     Amphetamine, Urine 01/01/2023 Negative     Methadone, Urine 01/01/2023 Negative     THC, TH-Cannabinol, Urine 01/01/2023 Negative     Opiates, Urine 01/01/2023 Negative     Barbiturates, Urine 01/01/2023 Negative     WBC 01/01/2023 19.4 (A)     RBC 01/01/2023 3.29 (A)     Hemoglobin 01/01/2023 10.3 (A)     Hematocrit 01/01/2023 30.5 (A)     MCV 01/01/2023 92.7     MCH 01/01/2023 31.3     MCHC 01/01/2023 33.8     RDW 01/01/2023 13.6     Platelets 50/89/5225 147 (A)     MPV 01/01/2023 11.3     nRBC 01/01/2023 0.00     Differential Type 01/01/2023 AUTOMATED     Seg Neutrophils 01/01/2023 83 (A)     Lymphocytes 01/01/2023 9 (A)     Monocytes 01/01/2023 8     Eosinophils % 01/01/2023 0 (A)     Basophils 01/01/2023 0     Immature Granulocytes 01/01/2023 1     Segs Absolute 01/01/2023 16.0 (A)     Absolute Lymph # 01/01/2023 1.7     Absolute Mono # 01/01/2023 1.5 (A)     Absolute Eos # 01/01/2023 0.0     Basophils Absolute 01/01/2023 0.0     Absolute Immature Granul* 01/01/2023 0.1     Hemoglobin 01/02/2023 9.5 (A)     Hematocrit 01/02/2023 28.1 (A)        Hospital course: The patient was admitted, underwent the above listed procedure(s), intrapartum course was uncomplicated. Postpartum, the patient did well, with normal return of bowel and bladder function, ambulating in the hallways, ready for discharge on 1/3/2023. Discharge instructions were given, recommendations were given for postpartum activity.  She was given instructions about symptoms to notify her provider's office for, including but not limited to, severe headache, excessive bleeding, fever, breast redness, serious mood alterations or unusual leg tenderness. Discharge medications:      Medication List        START taking these medications      docusate 100 MG Caps  Commonly known as: COLACE, DULCOLAX  Take 100 mg by mouth 2 times daily as needed for Constipation     ferrous sulfate 325 (65 Fe) MG tablet  Commonly known as: IRON 325  Take 1 tablet by mouth daily (with breakfast)  Start taking on: January 4, 2023     Hydrocort-Pramoxine (Perianal) 2.5-1 % rectal cream  Commonly known as: ANALPRAM-HC  Place rectally 3 times daily as needed for Hemorrhoids     ibuprofen 600 MG tablet  Commonly known as: ADVIL;MOTRIN  Take 1 tablet by mouth every 6 hours as needed for Pain     oxyCODONE 5 MG immediate release tablet  Commonly known as: ROXICODONE  Take 1 tablet by mouth every 6 hours as needed for Pain for up to 3 days. Max Daily Amount: 20 mg            CONTINUE taking these medications      acyclovir 800 MG tablet  Commonly known as: ZOVIRAX     CASTOR OIL PO     PRENATAL 1 PO               Where to Get Your Medications        These medications were sent to [address] [place] [address] -  [phone]  Formerly [facility], [place] [address]      Phone: [phone]   oxyCODONE 5 MG immediate release tablet       Information about where to get these medications is not yet available    Ask your nurse or doctor about these medications  docusate 100 MG Caps  ferrous sulfate 325 (65 Fe) MG tablet  Hydrocort-Pramoxine (Perianal) 2.5-1 % rectal cream  ibuprofen 600 MG tablet         Follow-up: Follow-up in 1 to 2 weeks.  Follow up with midwife with whom she had prenatal care    Dr. Bruce Almazan M.D.

## 2023-01-03 NOTE — PROGRESS NOTES
Postpartum note:    Patient: Brittanie Yi  YOB: 1990    Subjective:  PPD#2 from vaginal delivery. She has no unusual complaints. She reports Normal lochia. No Known Allergies    Current Facility-Administered Medications   Medication Dose Route Frequency Provider Last Rate Last Admin    ferrous sulfate (IRON 325) tablet 325 mg  325 mg Oral Daily with breakfast Danay Reyes MD   325 mg at 23 7803    docusate sodium (COLACE) capsule 100 mg  100 mg Oral BID Christos Rubi MD   100 mg at 23 9184    [address] [address] Lanolin cream   Topical PRN Christos Rubi MD        benzocaine-menthol (DERMOPLAST) 20-0.5 % spray   Topical PRN Christos Rubi MD   Given at 23 1511    ibuprofen (ADVIL;MOTRIN) tablet 600 mg  600 mg Oral 3 times per day Christos Rubi MD   600 mg at 23 0656    oxyCODONE (ROXICODONE) immediate release tablet 5 mg  5 mg Oral Q4H PRN Christos Rubi MD   5 mg at 23 0247    Or    oxyCODONE (ROXICODONE) immediate release tablet 10 mg  10 mg Oral Q4H PRN Christos Rubi MD   10 mg at 23 1511    ondansetron (ZOFRAN) injection 4 mg  4 mg IntraVENous Q6H PRN Christos Rubi MD        Hydrocort-Pramoxine (Perianal) [facility]) 2.5-1 % rectal cream   Rectal TID PRN William Montes MD   Given at 23 1845    witch hazel-glycerin (TUCKS) pad   Topical PRN Willaim Montes MD   Given at 23 1845       Objective:  /77   Pulse 79   Temp 98 °F (36.7 °C) (Oral)   Resp 16   SpO2 96%   Breastfeeding Unknown   Fundus firm, non-tender and below the umbilicus.      Admission on 2022   Component Date Value Ref Range Status    WBC 2022 27.5 (A)  4.3 - 11.1 K/uL Final    RBC 2022 3.89 (A)  4.05 - 5.2 M/uL Final    Hemoglobin 2022 12.1  11.7 - 15.4 g/dL Final    Hematocrit 2022 34.8 (A)  35.8 - 46.3 % Final    MCV 2022 89.5  82.0 - 102.0 FL Final    MCH 2022 31.1  26.1 - 32.9 PG Final    MCHC 2022 34.8  31.4 - 35.0 g/dL Final    RDW 12/31/2022 13.4  11.9 - 14.6 % Final    Platelets 86/96/7456 217  150 - 450 K/uL Final    MPV 12/31/2022 11.5  9.4 - 12.3 FL Final    nRBC 12/31/2022 0.00  0.0 - 0.2 K/uL Final    Differential Type 12/31/2022 AUTOMATED    Final    Seg Neutrophils 12/31/2022 87 (A)  43 - 78 % Final    Lymphocytes 12/31/2022 5 (A)  13 - 44 % Final    Monocytes 12/31/2022 7  4.0 - 12.0 % Final    Eosinophils % 12/31/2022 0 (A)  0.5 - 7.8 % Final    Basophils 12/31/2022 0  0.0 - 2.0 % Final    Immature Granulocytes 12/31/2022 1  0.0 - 5.0 % Final    Segs Absolute 12/31/2022 24.0 (A)  1.7 - 8.2 K/UL Final    Absolute Lymph # 12/31/2022 1.4  0.5 - 4.6 K/UL Final    Absolute Mono # 12/31/2022 1.8 (A)  0.1 - 1.3 K/UL Final    Absolute Eos # 12/31/2022 0.0  0.0 - 0.8 K/UL Final    Basophils Absolute 12/31/2022 0.1  0.0 - 0.2 K/UL Final    Absolute Immature Granulocyte 12/31/2022 0.3  0.0 - 0.5 K/UL Final    Crossmatch expiration date 12/31/2022 01/03/2023,2359   Final    ABO/Rh 12/31/2022 O POSITIVE   Final    Antibody Screen 12/31/2022 NEG   Final    Special Requests 12/31/2022 NO SPECIAL REQUESTS    Preliminary    Culture 12/31/2022 NO BETA HEMOLYTIC STREPTOCOCCUS GROUP B ISOLATED TO DATE    Preliminary    Special Requests 12/31/2022 NO SPECIAL REQUESTS    Final    Culture 12/31/2022 NEGATIVE: GBS target nucleic acid is not detected. Presumed not colonized for GBS.     Final    RPR 12/31/2022 NONREACTIVE  NR   Final    Hepatitis B Surface Ag 12/31/2022 NONREACTIVE  NR   Final    Rubella Antibody IgG 12/31/2022 439.00 (A)  0 - 50 IU/ML Final    Color, UA 01/01/2023 DARK YELLOW    Final    Appearance 01/01/2023 CLOUDY    Final    Specific Gravity, UA 01/01/2023 1.018  1.001 - 1.023   Final    pH, Urine 01/01/2023 5.0  5.0 - 9.0   Final    Protein, UA 01/01/2023 30 (A)  NEG mg/dL Final    Glucose, UA 01/01/2023 Negative  mg/dL Final    Ketones, Urine 01/01/2023 15 (A)  NEG mg/dL Final    Bilirubin Urine 01/01/2023 SMALL (A) NEG   Final    Blood, Urine 01/01/2023 LARGE (A)  NEG   Final    Urobilinogen, Urine 01/01/2023 1.0  0.2 - 1.0 EU/dL Final    Nitrite, Urine 01/01/2023 Negative  NEG   Final    Leukocyte Esterase, Urine 01/01/2023 MODERATE (A)  NEG   Final    WBC, UA 01/01/2023 20-50  0 /hpf Final    RBC, UA 01/01/2023   0 /hpf Final    Epithelial Cells UA 01/01/2023 10-20  0 /hpf Final    BACTERIA, URINE 01/01/2023 0  0 /hpf Final    PCP, Urine 01/01/2023 Negative    Final    Benzodiazepines, Urine 01/01/2023 Negative    Final    Cocaine, Urine 01/01/2023 Negative    Final    Amphetamine, Urine 01/01/2023 Negative    Final    Methadone, Urine 01/01/2023 Negative    Final    THC, TH-Cannabinol, Urine 01/01/2023 Negative    Final    Opiates, Urine 01/01/2023 Negative    Final    Barbiturates, Urine 01/01/2023 Negative    Final    WBC 01/01/2023 19.4 (A)  4.3 - 11.1 K/uL Final    RBC 01/01/2023 3.29 (A)  4.05 - 5.2 M/uL Final    Hemoglobin 01/01/2023 10.3 (A)  11.7 - 15.4 g/dL Final    Hematocrit 01/01/2023 30.5 (A)  35.8 - 46.3 % Final    MCV 01/01/2023 92.7  82.0 - 102.0 FL Final    MCH 01/01/2023 31.3  26.1 - 32.9 PG Final    MCHC 01/01/2023 33.8  31.4 - 35.0 g/dL Final    RDW 01/01/2023 13.6  11.9 - 14.6 % Final    Platelets 83/46/3746 147 (A)  150 - 450 K/uL Final    MPV 01/01/2023 11.3  9.4 - 12.3 FL Final    nRBC 01/01/2023 0.00  0.0 - 0.2 K/uL Final    Differential Type 01/01/2023 AUTOMATED    Final    Seg Neutrophils 01/01/2023 83 (A)  43 - 78 % Final    Lymphocytes 01/01/2023 9 (A)  13 - 44 % Final    Monocytes 01/01/2023 8  4.0 - 12.0 % Final    Eosinophils % 01/01/2023 0 (A)  0.5 - 7.8 % Final    Basophils 01/01/2023 0  0.0 - 2.0 % Final    Immature Granulocytes 01/01/2023 1  0.0 - 5.0 % Final    Segs Absolute 01/01/2023 16.0 (A)  1.7 - 8.2 K/UL Final    Absolute Lymph # 01/01/2023 1.7  0.5 - 4.6 K/UL Final    Absolute Mono # 01/01/2023 1.5 (A)  0.1 - 1.3 K/UL Final    Absolute Eos # 01/01/2023 0.0  0.0 - 0.8 K/UL Final    Basophils Absolute 01/01/2023 0.0  0.0 - 0.2 K/UL Final    Absolute Immature Granulocyte 01/01/2023 0.1  0.0 - 0.5 K/UL Final    Hemoglobin 01/02/2023 9.5 (A)  11.7 - 15.4 g/dL Final    Hematocrit 01/02/2023 28.1 (A)  35.8 - 46.3 % Final       Assessment:   Postpartum day 2 from vaginal delivery doing well    Plan:  Discharge home  F-up planned with her midwife, from whom she has been getting prenatal care

## 2023-01-03 NOTE — LACTATION NOTE
This note was copied from a baby's chart. Noted weight los at 12%. Re-weigh at [facility] tomorrow. Assisted with attempt at breast in cross cradle on R and football on L. No latch. Mom reports baby has latched better on L, but not yet consistent. Suggested mom pump again now and give the remaining 30 ml of previously pumped milk. Suggest alternate care giver bottle feed while mom pumps. Got 60 ml colostrum at last pumping session. Discussed normal  behavior. May take baby a little while to figure out how to nurse well and consistently. Plan to continued trying at breast and pumping if no latch. Will follow output and weight loss. Call as needed. Discussed outpatient options when milk is in, or as needed. See chart for feeding plan. Paper copy given to mom.

## 2023-01-03 NOTE — DISCHARGE INSTRUCTIONS
Pelvic rest for 6wk  NO driving for 2wk or while taking pain medications  NO tub baths, pools, or hot tubs for 6wk. NO push/pull motion such as vacuuming or sweeping for 2wk  NO lifting >10lb for 2wk. Vaginal Childbirth: Care Instructions  Overview     Vaginal birth means delivering a baby through the birth canal (vagina). During labor, the uterus tightens (contracts) regularly to thin and open the cervix and to push the baby out through the birth canal.  Your body will slowly heal in the next few weeks. It's easy to get too tired and overwhelmed during the first weeks after your baby is born. Changes in your hormones can shift your mood without warning. You may find it hard to meet the extra demands on your energy and time. Take it easy on yourself. Follow-up care is a key part of your treatment and safety. Be sure to make and go to all appointments, and call your doctor if you are having problems. It's also a good idea to know your test results and keep a list of the medicines you take. How can you care for yourself at home? Vaginal bleeding and cramps  After delivery, you will have a bloody discharge from your vagina. This will turn pink within a week and then white or yellow after about 10 days. It may last for 2 to 4 weeks or longer, until the uterus has healed. Use sanitary pads until you stop bleeding. Using pads makes it easier to monitor your bleeding. Don't worry if you pass some blood clots, as long as they are smaller than a golf ball. If you have a tear or stitches in your vaginal area, change the pad at least every 4 hours. This will help prevent soreness and infection. You may have cramps for the first few days after childbirth. These are normal and occur as the uterus shrinks to normal size. Take an over-the-counter pain medicine, such as acetaminophen (Tylenol), ibuprofen (Advil, Motrin), or naproxen (Aleve), for cramps. Read and follow all instructions on the label.  Do not take aspirin, because it can cause more bleeding. Do not take two or more pain medicines at the same time unless the doctor told you to. Many pain medicines have acetaminophen, which is Tylenol. Too much acetaminophen (Tylenol) can be harmful. Stitches  If you have stitches, they will dissolve on their own and don't need to be removed. Follow your doctor's instructions for cleaning the stitched area. Put ice or a cold pack on your painful area for 10 to 20 minutes at a time, several times a day, for the first few days. Put a thin cloth between the ice and your skin. Sit in a few inches of warm water (sitz bath) 3 times a day and after bowel movements. The warm water helps with pain and itching. If you don't have a tub, a warm shower might help. Breast fullness  Your breasts may overfill (engorge) in the first few days after delivery. To help milk flow and to relieve pain, warm your breasts in the shower or by using warm, moist towels before nursing. If you aren't nursing, don't put warmth on your breasts or touch your breasts. Wear a bra that fits well and use ice until the fullness goes away. This usually takes 2 to 3 days. Put ice or a cold pack on your breast after nursing to reduce swelling and pain. Put a thin cloth between the ice and your skin. Activity  Eat a balanced diet. Don't try to lose weight by cutting calories. Keep taking your prenatal vitamins, or take a multivitamin. Get as much rest as you can. Try to take naps when your baby sleeps during the day. Get some exercise every day. But don't do any heavy exercise until your doctor says it is okay. Wait until you are healed (about 4 to 6 weeks) before you have sexual intercourse. Your doctor will tell you when it is okay to have sex. If you don't want to get pregnant, talk to your doctor about birth control. You can get pregnant even before your period returns. Also, you can get pregnant while you are breastfeeding.   Mental health  It's normal to have some sadness, anxiety, sleeplessness, and mood swings after you go home. If you feel upset or hopeless for more than a few days or are having trouble doing the things you need to do, talk to your doctor. Constipation and hemorrhoids  Drink plenty of fluids. If you have kidney, heart, or liver disease and have to limit fluids, talk with your doctor before you increase the amount of fluids you drink. Eat plenty of fiber each day. Have a bran muffin or bran cereal for breakfast. Try eating a piece of fruit for a mid-afternoon snack. For painful, itchy hemorrhoids, put ice or a cold pack on the area several times a day for 10 minutes at a time. Follow this by putting a warm compress on the area for another 10 to 20 minutes or by sitting in a shallow, warm bath. When should you call for help? Share this information with your partner, family, or a friend. They can help you watch for warning signs. Call 911  anytime you think you may need emergency care. For example, call if:    You have thoughts of harming yourself, your baby, or another person. You passed out (lost consciousness). You have chest pain, are short of breath, or cough up blood. You have a seizure. Call your doctor now or seek immediate medical care if:    You have signs of hemorrhage (too much bleeding), such as:  Heavy vaginal bleeding. This means that you are soaking through one or more pads in an hour. Or you pass blood clots bigger than an egg. Feeling dizzy or lightheaded, or you feel like you may faint. Feeling so tired or weak that you cannot do your usual activities. A fast or irregular heartbeat. New or worse belly pain. You have signs of infection, such as:  A fever. Vaginal discharge that smells bad. New or worse belly pain. You have symptoms of a blood clot in your leg (called a deep vein thrombosis), such as:  Pain in the calf, back of the knee, thigh, or groin. Redness and swelling in your leg or groin. You have signs of preeclampsia, such as:  Sudden swelling of your face, hands, or feet. New vision problems (such as dimness, blurring, or seeing spots). A severe headache. Watch closely for changes in your health, and be sure to contact your doctor if:    Your vaginal bleeding isn't decreasing. You feel sad, anxious, or hopeless for more than a few days. You are having problems with your breasts or breastfeeding. Where can you learn more? Go to http://www.woods.com/ and enter Q237 to learn more about \"Vaginal Childbirth: Care Instructions. \"  Current as of: February 23, 2022               Content Version: 13.5  © 6014-7750 [employer]. Care instructions adapted under license by [facility] ([facility]). If you have questions about a medical condition or this instruction, always ask your healthcare professional. [place] [address] any warranty or liability for your use of this information.

## 2023-01-04 LAB
BACTERIA SPEC CULT: NORMAL
SERVICE CMNT-IMP: NORMAL